# Patient Record
Sex: MALE | Race: WHITE | NOT HISPANIC OR LATINO | URBAN - METROPOLITAN AREA
[De-identification: names, ages, dates, MRNs, and addresses within clinical notes are randomized per-mention and may not be internally consistent; named-entity substitution may affect disease eponyms.]

---

## 2024-10-21 ENCOUNTER — TELEPHONE (OUTPATIENT)
Dept: PRIMARY CARE | Facility: CLINIC | Age: 24
End: 2024-10-21

## 2024-10-21 PROBLEM — M43.17 SPONDYLOLISTHESIS AT L5-S1 LEVEL: Status: ACTIVE | Noted: 2024-10-21

## 2024-10-21 PROBLEM — L70.9 ACNE: Status: ACTIVE | Noted: 2024-10-21

## 2024-10-21 PROBLEM — J02.9 ACUTE PHARYNGITIS: Status: ACTIVE | Noted: 2024-10-21

## 2024-10-21 PROBLEM — S05.8X2A: Status: ACTIVE | Noted: 2024-10-21

## 2024-10-21 PROBLEM — K59.09 CONSTIPATION, CHRONIC: Status: ACTIVE | Noted: 2024-10-21

## 2024-10-21 PROBLEM — R53.83 FATIGUE: Status: ACTIVE | Noted: 2024-10-21

## 2024-10-21 PROBLEM — M76.52 PATELLAR TENDINITIS OF LEFT KNEE: Status: ACTIVE | Noted: 2024-10-21

## 2024-10-21 PROBLEM — R01.1 SYSTOLIC MURMUR: Status: ACTIVE | Noted: 2024-10-21

## 2024-10-21 PROBLEM — Q22.2 CONGENITAL PULMONARY VALVE INSUFFICIENCY (HHS-HCC): Status: ACTIVE | Noted: 2024-10-21

## 2024-10-21 PROBLEM — M54.9 MID BACK PAIN: Status: ACTIVE | Noted: 2024-10-21

## 2024-10-21 PROBLEM — R01.2: Status: ACTIVE | Noted: 2024-10-21

## 2024-10-21 PROBLEM — L73.9 FOLLICULITIS: Status: ACTIVE | Noted: 2024-10-21

## 2024-10-21 PROBLEM — F95.9 FACIAL TIC: Status: ACTIVE | Noted: 2024-10-21

## 2024-10-21 NOTE — TELEPHONE ENCOUNTER
The patient called requesting a referral to see a pulmonologist, He has not been seen in 2 years. Please call and have the patient book an appointment.

## 2024-12-19 ENCOUNTER — LAB (OUTPATIENT)
Dept: LAB | Facility: LAB | Age: 24
End: 2024-12-19
Payer: COMMERCIAL

## 2024-12-19 ENCOUNTER — APPOINTMENT (OUTPATIENT)
Dept: PRIMARY CARE | Facility: CLINIC | Age: 24
End: 2024-12-19
Payer: COMMERCIAL

## 2024-12-19 VITALS
DIASTOLIC BLOOD PRESSURE: 84 MMHG | HEART RATE: 80 BPM | BODY MASS INDEX: 25.25 KG/M2 | OXYGEN SATURATION: 99 % | HEIGHT: 76 IN | TEMPERATURE: 97.9 F | WEIGHT: 207.4 LBS | SYSTOLIC BLOOD PRESSURE: 116 MMHG

## 2024-12-19 DIAGNOSIS — Q22.2 CONGENITAL PULMONARY VALVE INSUFFICIENCY (HHS-HCC): ICD-10-CM

## 2024-12-19 DIAGNOSIS — Z76.89 ENCOUNTER TO ESTABLISH CARE: ICD-10-CM

## 2024-12-19 DIAGNOSIS — R01.1 SYSTOLIC MURMUR: ICD-10-CM

## 2024-12-19 DIAGNOSIS — Z76.89 ENCOUNTER TO ESTABLISH CARE: Primary | ICD-10-CM

## 2024-12-19 DIAGNOSIS — B36.0 TINEA VERSICOLOR: ICD-10-CM

## 2024-12-19 LAB
ALBUMIN SERPL BCP-MCNC: 4.4 G/DL (ref 3.4–5)
ALP SERPL-CCNC: 44 U/L (ref 33–120)
ALT SERPL W P-5'-P-CCNC: 25 U/L (ref 10–52)
ANION GAP SERPL CALC-SCNC: 10 MMOL/L (ref 10–20)
AST SERPL W P-5'-P-CCNC: 63 U/L (ref 9–39)
BILIRUB SERPL-MCNC: 0.9 MG/DL (ref 0–1.2)
BUN SERPL-MCNC: 15 MG/DL (ref 6–23)
CALCIUM SERPL-MCNC: 9.5 MG/DL (ref 8.6–10.3)
CHLORIDE SERPL-SCNC: 106 MMOL/L (ref 98–107)
CHOLEST SERPL-MCNC: 162 MG/DL (ref 0–199)
CHOLESTEROL/HDL RATIO: 3.4
CO2 SERPL-SCNC: 29 MMOL/L (ref 21–32)
CREAT SERPL-MCNC: 0.94 MG/DL (ref 0.5–1.3)
EGFRCR SERPLBLD CKD-EPI 2021: >90 ML/MIN/1.73M*2
ERYTHROCYTE [DISTWIDTH] IN BLOOD BY AUTOMATED COUNT: 11.9 % (ref 11.5–14.5)
GLUCOSE SERPL-MCNC: 90 MG/DL (ref 74–99)
HCT VFR BLD AUTO: 45.9 % (ref 41–52)
HDLC SERPL-MCNC: 48.1 MG/DL
HGB BLD-MCNC: 15.5 G/DL (ref 13.5–17.5)
LDLC SERPL CALC-MCNC: 98 MG/DL
MAGNESIUM SERPL-MCNC: 1.95 MG/DL (ref 1.6–2.4)
MCH RBC QN AUTO: 31.8 PG (ref 26–34)
MCHC RBC AUTO-ENTMCNC: 33.8 G/DL (ref 32–36)
MCV RBC AUTO: 94 FL (ref 80–100)
NON HDL CHOLESTEROL: 114 MG/DL (ref 0–149)
NRBC BLD-RTO: 0 /100 WBCS (ref 0–0)
PLATELET # BLD AUTO: 220 X10*3/UL (ref 150–450)
POTASSIUM SERPL-SCNC: 4.2 MMOL/L (ref 3.5–5.3)
PROT SERPL-MCNC: 6.9 G/DL (ref 6.4–8.2)
RBC # BLD AUTO: 4.87 X10*6/UL (ref 4.5–5.9)
SODIUM SERPL-SCNC: 141 MMOL/L (ref 136–145)
TRIGL SERPL-MCNC: 82 MG/DL (ref 0–114)
VLDL: 16 MG/DL (ref 0–40)
WBC # BLD AUTO: 7.9 X10*3/UL (ref 4.4–11.3)

## 2024-12-19 PROCEDURE — 3008F BODY MASS INDEX DOCD: CPT | Performed by: FAMILY MEDICINE

## 2024-12-19 PROCEDURE — 99204 OFFICE O/P NEW MOD 45 MIN: CPT | Performed by: FAMILY MEDICINE

## 2024-12-19 PROCEDURE — 36415 COLL VENOUS BLD VENIPUNCTURE: CPT

## 2024-12-19 PROCEDURE — 80053 COMPREHEN METABOLIC PANEL: CPT

## 2024-12-19 PROCEDURE — 83735 ASSAY OF MAGNESIUM: CPT

## 2024-12-19 PROCEDURE — 85027 COMPLETE CBC AUTOMATED: CPT

## 2024-12-19 PROCEDURE — 80061 LIPID PANEL: CPT

## 2024-12-19 RX ORDER — KETOCONAZOLE 20 MG/G
CREAM TOPICAL 2 TIMES DAILY
Qty: 60 G | Refills: 2 | Status: SHIPPED | OUTPATIENT
Start: 2024-12-19 | End: 2025-01-02

## 2024-12-19 ASSESSMENT — ENCOUNTER SYMPTOMS
HEMATURIA: 0
DIARRHEA: 0
PALPITATIONS: 0
ARTHRALGIAS: 0
HEADACHES: 0
CONSTIPATION: 0
FLANK PAIN: 0
DYSURIA: 0
FEVER: 0
LIGHT-HEADEDNESS: 0
ABDOMINAL PAIN: 0
COUGH: 0
AGITATION: 0
NAUSEA: 0
JOINT SWELLING: 0
SORE THROAT: 0
WHEEZING: 0
NERVOUS/ANXIOUS: 0
SHORTNESS OF BREATH: 0
UNEXPECTED WEIGHT CHANGE: 0
FATIGUE: 0
MYALGIAS: 0

## 2024-12-19 ASSESSMENT — PATIENT HEALTH QUESTIONNAIRE - PHQ9
SUM OF ALL RESPONSES TO PHQ9 QUESTIONS 1 AND 2: 0
1. LITTLE INTEREST OR PLEASURE IN DOING THINGS: NOT AT ALL
2. FEELING DOWN, DEPRESSED OR HOPELESS: NOT AT ALL

## 2024-12-19 NOTE — PROGRESS NOTES
Subjective   Chief complaint: Farhat Jackson is a 24 y.o. male who presents for Establish Care (Patient is seeking a referral to pulmonology ).    HPI:  HPI  To establish.  Complains of a rash.  On his back.  Scaly.  Red.  Circular.  Patches.  Hypopigmented.  Tinea versicolor.  Topical prescription.  History of congenital pulmonic valve disorder.  Has not been seen by cardiology recently.  Previous echo from 3 years ago was reviewed.  Abnormal.  Murmur noted on examination.  Will facilitate cardiology consultation.  General medical labs are ordered.  Past medical family surgical social history reviewed.  Follow-up as scheduled.    Past Medical History:   Diagnosis Date    Dorsalgia, unspecified 11/02/2021    Mid back pain     Past Surgical History:   Procedure Laterality Date    OTHER SURGICAL HISTORY  10/27/2021    Surgery     Social History     Socioeconomic History    Marital status: Single     Spouse name: Not on file    Number of children: Not on file    Years of education: Not on file    Highest education level: Not on file   Occupational History    Not on file   Tobacco Use    Smoking status: Every Day     Types: Cigarettes    Smokeless tobacco: Never   Vaping Use    Vaping status: Never Used   Substance and Sexual Activity    Alcohol use: Not Currently    Drug use: Not Currently    Sexual activity: Not on file   Other Topics Concern    Not on file   Social History Narrative    Not on file     Social Drivers of Health     Financial Resource Strain: Not on file   Food Insecurity: Not on file   Transportation Needs: Not on file   Physical Activity: Not on file   Stress: Not on file   Social Connections: Not on file   Intimate Partner Violence: Not on file   Housing Stability: Not on file     Family History   Problem Relation Name Age of Onset    Cancer Mother      Diverticulosis Father      Cancer Maternal Grandmother      Cancer Other         Objective   /84 (BP Location: Left arm, Patient Position:  "Sitting, BP Cuff Size: Adult)   Pulse 80   Temp 36.6 °C (97.9 °F) (Temporal)   Ht 1.93 m (6' 4\")   Wt 94.1 kg (207 lb 6.4 oz)   SpO2 99% Comment: RA  BMI 25.25 kg/m²   Physical Exam  Vitals and nursing note reviewed.   Constitutional:       General: He is not in acute distress.     Appearance: He is not ill-appearing or toxic-appearing.   HENT:      Head: Normocephalic and atraumatic.      Mouth/Throat:      Pharynx: No oropharyngeal exudate or posterior oropharyngeal erythema.   Eyes:      Extraocular Movements: Extraocular movements intact.      Conjunctiva/sclera: Conjunctivae normal.      Pupils: Pupils are equal, round, and reactive to light.   Cardiovascular:      Rate and Rhythm: Normal rate and regular rhythm.      Pulses: Normal pulses.      Heart sounds: Murmur heard.   Pulmonary:      Effort: Pulmonary effort is normal.      Breath sounds: Normal breath sounds. No wheezing, rhonchi or rales.   Abdominal:      General: Abdomen is flat. Bowel sounds are normal. There is no distension.      Palpations: Abdomen is soft. There is no mass.      Tenderness: There is no abdominal tenderness.      Hernia: No hernia is present.   Musculoskeletal:         General: No swelling or deformity. Normal range of motion.      Cervical back: Normal range of motion and neck supple.   Skin:     General: Skin is warm and dry.      Capillary Refill: Capillary refill takes less than 2 seconds.      Coloration: Skin is not jaundiced.      Findings: No erythema or rash.   Neurological:      General: No focal deficit present.      Mental Status: He is oriented to person, place, and time. Mental status is at baseline.   Psychiatric:         Mood and Affect: Mood normal.         Behavior: Behavior normal.         Thought Content: Thought content normal.         Judgment: Judgment normal.       Review of Systems   Constitutional:  Negative for fatigue, fever and unexpected weight change.   HENT:  Negative for congestion and sore " throat.    Respiratory:  Negative for cough, shortness of breath and wheezing.    Cardiovascular:  Negative for chest pain, palpitations and leg swelling.   Gastrointestinal:  Negative for abdominal pain, constipation, diarrhea and nausea.   Genitourinary:  Negative for dysuria, flank pain and hematuria.   Musculoskeletal:  Negative for arthralgias, joint swelling and myalgias.   Skin:  Negative for rash.   Neurological:  Negative for syncope, light-headedness and headaches.   Psychiatric/Behavioral:  Negative for agitation. The patient is not nervous/anxious.       I have reviewed and reconciled the medication list with the patient today.   Current Outpatient Medications:     ketoconazole (NIZOral) 2 % cream, Apply topically 2 times a day for 14 days., Disp: 60 g, Rfl: 2     Imaging:  No results found.     Labs reviewed:    Lab Results   Component Value Date    WBC 6.3 05/07/2022    HGB 14.9 05/07/2022    HCT 44.8 05/07/2022     05/07/2022    CHOL 127 10/27/2020    TRIG 52 10/27/2020    HDL 42.0 10/27/2020    ALT 95 (H) 05/07/2022    AST 68 (H) 05/07/2022     05/07/2022    K 4.6 05/07/2022     05/07/2022    CREATININE 0.89 05/07/2022    BUN 16 05/07/2022    CO2 28 05/07/2022       Assessment/Plan   Problem List Items Addressed This Visit             ICD-10-CM    Congenital pulmonary valve insufficiency (HHS-HCC) Q22.2    Relevant Orders    Referral to Cardiology    Systolic murmur R01.1     Other Visit Diagnoses         Codes    Encounter to establish care    -  Primary Z76.89    Relevant Orders    Comprehensive metabolic panel    Lipid panel    CBC    Magnesium    Tinea versicolor     B36.0    Relevant Medications    ketoconazole (NIZOral) 2 % cream            Reinforced lifestyle modifications  Continue current medications as listed  Physical activity as tolerated and healthy diet encouraged  Maintain a healthy weight  Follow up in 6mo

## 2024-12-26 ENCOUNTER — APPOINTMENT (OUTPATIENT)
Dept: CARDIOLOGY | Facility: CLINIC | Age: 24
End: 2024-12-26
Payer: COMMERCIAL

## 2024-12-26 ENCOUNTER — ANCILLARY PROCEDURE (OUTPATIENT)
Dept: CARDIOLOGY | Facility: CLINIC | Age: 24
End: 2024-12-26
Payer: COMMERCIAL

## 2024-12-26 VITALS
SYSTOLIC BLOOD PRESSURE: 124 MMHG | BODY MASS INDEX: 24.84 KG/M2 | WEIGHT: 204 LBS | HEART RATE: 52 BPM | HEIGHT: 76 IN | DIASTOLIC BLOOD PRESSURE: 82 MMHG

## 2024-12-26 DIAGNOSIS — R00.1 BRADYCARDIA: Primary | ICD-10-CM

## 2024-12-26 DIAGNOSIS — Q22.2 CONGENITAL PULMONARY VALVE INSUFFICIENCY (HHS-HCC): ICD-10-CM

## 2024-12-26 DIAGNOSIS — R00.1 BRADYCARDIA: ICD-10-CM

## 2024-12-26 LAB — BODY SURFACE AREA: 2.23 M2

## 2024-12-26 PROCEDURE — 93000 ELECTROCARDIOGRAM COMPLETE: CPT | Performed by: NURSE PRACTITIONER

## 2024-12-26 PROCEDURE — 3008F BODY MASS INDEX DOCD: CPT | Performed by: NURSE PRACTITIONER

## 2024-12-26 PROCEDURE — 99204 OFFICE O/P NEW MOD 45 MIN: CPT | Performed by: NURSE PRACTITIONER

## 2024-12-26 NOTE — PROGRESS NOTES
Farhat Jackson is a 24 y.o. male that presents to the office today for  new patient cardiac evaluation.  He was referred by his PCP Dr. South for pulmonary valve stenosis.  He previously followed with [facility] pediatric cardiology for mild pulmonary valve stenosis.  Other medical history as noted below. He denies tobacco use or vaping.  Admits to daily cannabis use, social alcohol use.  Daily caffeine intake 1-2 cups coffee. He exercises on a regular basis, with cardio and weights, admits to running 2-3 times per week 2-3 miles at a time.     He denies cheat pian, chest pressure, chest tightness, shortness of breath, palpitations, lightheadedness, dizziness.      Testing Reviewed  EKG obtained in the office today and verified with Dr. Rolon shows sinus bradycardia with marked sinus arrhythmia, rightward axis HR 52  bpm, QT/Qtc 398/370    8/10/2018 Echo   1. Doming pulmonary valve.   2. Mild pulmonary stenosis, peak gradient: 30 mmHg, mean gradient: 18 mmHg.   3. Mild pulmonary insufficiency.   4. Mildly dilated left pulmonary artery.   5. Qualitatively, borderline / mildly dilated right ventricle with normal systolic    function.   6. Normal left ventricular size and systolic function.     CBC:   Lab Results   Component Value Date    WBC 7.9 12/19/2024    RBC 4.87 12/19/2024    HGB 15.5 12/19/2024    HCT 45.9 12/19/2024     12/19/2024        CMP:    Lab Results   Component Value Date     12/19/2024    K 4.2 12/19/2024     12/19/2024    CO2 29 12/19/2024    BUN 15 12/19/2024    CREATININE 0.94 12/19/2024    GLUCOSE 90 12/19/2024    CALCIUM 9.5 12/19/2024       Lipid Profile:    Lab Results   Component Value Date    TRIG 82 12/19/2024    HDL 48.1 12/19/2024    LDLCALC 98 12/19/2024       BMP:  Lab Results   Component Value Date     12/19/2024     05/07/2022     10/27/2020    K 4.2 12/19/2024    K 4.6 05/07/2022    K 4.2 10/27/2020     12/19/2024     05/07/2022      "10/27/2020    CO2 29 12/19/2024    CO2 28 05/07/2022    CO2 27 10/27/2020    BUN 15 12/19/2024    BUN 16 05/07/2022    BUN 20 10/27/2020    CREATININE 0.94 12/19/2024    CREATININE 0.89 05/07/2022    CREATININE 1.09 10/27/2020       CBC:  Lab Results   Component Value Date    WBC 7.9 12/19/2024    WBC 6.3 05/07/2022    RBC 4.87 12/19/2024    RBC 4.61 05/07/2022    HGB 15.5 12/19/2024    HGB 14.9 05/07/2022    HCT 45.9 12/19/2024    HCT 44.8 05/07/2022    MCV 94 12/19/2024    MCV 97 05/07/2022    MCH 31.8 12/19/2024    MCHC 33.8 12/19/2024    MCHC 33.3 05/07/2022    RDW 11.9 12/19/2024    RDW 12.2 05/07/2022     12/19/2024     05/07/2022       Hepatic Function Panel:    Lab Results   Component Value Date    ALKPHOS 44 12/19/2024    ALT 25 12/19/2024    AST 63 (H) 12/19/2024    PROT 6.9 12/19/2024    BILITOT 0.9 12/19/2024       HgBA1c:    No results found for: \"HGBA1C\"    Magnesium:    Lab Results   Component Value Date    MG 1.95 12/19/2024             Patient Active Problem List   Diagnosis    Abrasion of eye, left, initial encounter    Acne    Acute pharyngitis    Congenital pulmonary valve insufficiency (HHS-HCC)    Constipation, chronic    Facial tic    Fatigue    Folliculitis    Mid back pain    Patellar tendinitis of left knee    Split S1 (first heart sound)    Spondylolisthesis at L5-S1 level    Systolic murmur       Social History     Tobacco Use    Smoking status: Every Day     Types: Cigarettes    Smokeless tobacco: Never   Vaping Use    Vaping status: Never Used   Substance Use Topics    Alcohol use: Not Currently    Drug use: Not Currently       Past Medical History:   Diagnosis Date    Dorsalgia, unspecified 11/02/2021    Mid back pain         Current Outpatient Medications:     ketoconazole (NIZOral) 2 % cream, Apply topically 2 times a day for 14 days., Disp: 60 g, Rfl: 2    MAGNESIUM ORAL, Take by mouth once daily., Disp: , Rfl:     Patient has no known allergies.    Family History " "  Problem Relation Name Age of Onset    Cancer Mother      Diverticulosis Father      Cancer Maternal Grandmother      Cancer Other         Past Surgical History:   Procedure Laterality Date    OTHER SURGICAL HISTORY  10/27/2021    Surgery          Review of systems  Constitutional: No weight loss, fever, chills, weakness or fatigue  HEENT: No visual loss, blurred vision, double vision or yellow sclerae  Skin: No rash or itching  Cardiovascular: No chest pain, pressure or discomfort, No palpitations or edema.  Respiratory: No shortness of breath, cough or sputum  Gastrointestinal: No nausea, vomiting or diarrhea. No bloody or dark tarry stools.  Neurological: No headache, lightheadedness, dizziness, syncope.   Musculoskeletal: No muscle, back pain, joint pain or stiffness.  Hematologic: No anemia, bleeding or bruising.    /82 (BP Location: Left arm, Patient Position: Sitting)   Pulse 52   Ht 1.93 m (6' 4\")   Wt 92.5 kg (204 lb)   BMI 24.83 kg/m²     Physical Exam  Constitutional: Well developed, awake/alert x 3, no distress.  Head/Neck: No JVD, No bruits  Respiratory/Thorax: patent airways, CTAB, normal breath sounds with good expansion.  Cardiovascular: Regular rate and rhythm,  normal S1 and S2,   Gastrointestinal: Non distended, soft, non-tender, no rebound tenderness or guarding.  Extremities: No cyanosis, edema.   Neurological: Alert and oriented x 3. Moves extremities spontaneous with purpose.  Psychological: Appropriate mood and behavior  Skin: Warm and Dry. No lesions or rashes.     Assessment/Plan  Pulmonary valve stenosis:  Previous echo as noted above.  Obtain echocardiogram to assess valve and pump function  Bradycardia:  EKG as noted in office today.  He reports that his watch often indicates his HR ranges from 49 - 80's at rest.  Will obtain 48 hr holter monitor.   Follow in office with Dr. Thrasher/Dr. Rolon after testing for results and further recommendations.       Please excuse any errors " in grammar or translation related to dictation, voice recognition software was used to prepare this document.

## 2025-01-03 LAB — BODY SURFACE AREA: 2.23 M2

## 2025-01-03 PROCEDURE — 93227 XTRNL ECG REC<48 HR R&I: CPT | Performed by: INTERNAL MEDICINE

## 2025-01-14 ENCOUNTER — APPOINTMENT (OUTPATIENT)
Dept: CARDIOLOGY | Facility: CLINIC | Age: 25
End: 2025-01-14
Payer: COMMERCIAL

## 2025-01-14 ENCOUNTER — ANCILLARY PROCEDURE (OUTPATIENT)
Dept: CARDIOLOGY | Facility: HOSPITAL | Age: 25
End: 2025-01-14
Payer: COMMERCIAL

## 2025-01-14 DIAGNOSIS — R01.1 CARDIAC MURMUR, UNSPECIFIED: ICD-10-CM

## 2025-01-14 DIAGNOSIS — Q22.2 CONGENITAL PULMONARY VALVE INSUFFICIENCY (HHS-HCC): ICD-10-CM

## 2025-01-14 PROCEDURE — 93306 TTE W/DOPPLER COMPLETE: CPT

## 2025-01-14 PROCEDURE — 93306 TTE W/DOPPLER COMPLETE: CPT | Performed by: INTERNAL MEDICINE

## 2025-01-15 LAB
AORTIC VALVE MEAN GRADIENT: 2 MMHG
AORTIC VALVE PEAK VELOCITY: 0.99 M/S
AV PEAK GRADIENT: 4 MMHG
AVA (PEAK VEL): 2.87 CM2
AVA (VTI): 2.71 CM2
EJECTION FRACTION APICAL 4 CHAMBER: 49.2
EJECTION FRACTION: 60 %
LEFT VENTRICLE INTERNAL DIMENSION DIASTOLE: 4.7 CM (ref 3.5–6)
LEFT VENTRICULAR OUTFLOW TRACT DIAMETER: 2.11 CM
LV EJECTION FRACTION BIPLANE: 54 %
MITRAL VALVE E/A RATIO: 1.59
RIGHT VENTRICLE FREE WALL PEAK S': 11.46 CM/S
RIGHT VENTRICLE PEAK SYSTOLIC PRESSURE: 17.2 MMHG
TRICUSPID ANNULAR PLANE SYSTOLIC EXCURSION: 2.1 CM

## 2025-02-04 ENCOUNTER — APPOINTMENT (OUTPATIENT)
Dept: CARDIOLOGY | Facility: CLINIC | Age: 25
End: 2025-02-04
Payer: COMMERCIAL

## 2025-03-13 ENCOUNTER — APPOINTMENT (OUTPATIENT)
Dept: CARDIOLOGY | Facility: CLINIC | Age: 25
End: 2025-03-13
Payer: COMMERCIAL

## 2025-03-13 VITALS
DIASTOLIC BLOOD PRESSURE: 64 MMHG | SYSTOLIC BLOOD PRESSURE: 106 MMHG | BODY MASS INDEX: 29.36 KG/M2 | HEIGHT: 72 IN | HEART RATE: 74 BPM | WEIGHT: 216.8 LBS

## 2025-03-13 DIAGNOSIS — Q25.6 MILD PULMONARY STENOSIS (HHS-HCC): ICD-10-CM

## 2025-03-13 DIAGNOSIS — R01.1 SYSTOLIC MURMUR: Primary | ICD-10-CM

## 2025-03-13 DIAGNOSIS — R00.1 BRADYCARDIA: ICD-10-CM

## 2025-03-13 PROCEDURE — 3008F BODY MASS INDEX DOCD: CPT | Performed by: INTERNAL MEDICINE

## 2025-03-13 PROCEDURE — 99213 OFFICE O/P EST LOW 20 MIN: CPT | Performed by: INTERNAL MEDICINE

## 2025-03-13 NOTE — PATIENT INSTRUCTIONS
Follow up as needed only      DID YOU KNOW  We have a pharmacy here in the [facility].  They can fill all prescriptions, not just cardiac medications.  Prescriptions from other pharmacies can easily be transferred to the  pharmacy by the  pharmacist on site.   pharmacies offer FREE HOME DELIVERY on medications to anywhere in [place]. They can sync your medications. Typically prescriptions can be ready in 10 - 15 minutes. If pharmacy is unable to fill your  prescription or if cost is more than your paying now the Pharmacist can easily transfer back to your Pharmacy of choice. Pharmacy phone # [phone].     Please bring all medicines, vitamins, and herbal supplements with you in original bottles to every appointment!!!!    Prescriptions will not be filled unless you are compliant with your follow up appointments or have a follow up appointment scheduled as per instruction of your physician. Refills should be requested at the time of your visit.

## 2025-03-13 NOTE — PROGRESS NOTES
Patient:  Farhat Jackson  YOB: 2000  MRN: [id number]       HPI:       Farhat Jackson is a 25 y.o. male who returns today for cardiac follow-up.  He was seen in consultation by RANJEET Arreaga on December 26, 2024 for evaluation of pulmonic stenosis.  He was previously followed by [facility] pediatric cardiology.  He was diagnosed with mild pulmonic stenosis.  No history of atherosclerotic heart disease.  No history of hypertension, hyperlipidemia, or diabetes mellitus.  No tobacco use.  He uses cannabis on a daily basis.      An echocardiogram on January 14, 2025 showed an estimated LV ejection fraction of 60%.  Normal right ventricular systolic function.  Normal chamber size and function.  Peak velocity across the pulmonic valve is 1.7 m/s with a peak gradient 12 mmHg consistent with mild colonic stenosis.     He has been doing very well since his last visit.  He remains very active without any limitations.  He works out on a daily basis.  He is in his senior year at [employer] and is studying food science.  He works at a [employer].  Insert he denies lab studies December 19, 2024 showed a normal CBC and CMP with exception of an AST of 63.  Cholesterol 162 with HDL 48, LDL 98, and triglycerides 82.    He clinically is stable from a cardiac standpoint.  I advised that he have a follow-up echocardiogram in approximately 3 years.  Follow-up at that time or sooner if any cardiovascular related symptoms arise.  Other details noted below.    The above portion of this note was dictated by me using voice recognition software.  I personally performed the services described in the documentation.  The scribe entering the documentation below was in my presence.  I affirm that the information is both accurate and complete.      Objective:     Vitals:    03/13/25 0753   BP: 106/64   Pulse: 74       Wt Readings from Last 4 Encounters:   03/13/25 98.3 kg (216 lb 12.8 oz)   12/26/24 92.5 kg (204  lb)   12/19/24 94.1 kg (207 lb 6.4 oz)   06/29/22 71.9 kg (158 lb 8 oz)       Allergies:     No Known Allergies       Medications:     Current Outpatient Medications   Medication Instructions    MAGNESIUM ORAL Daily       Physical Examination:   GENERAL:  Well developed, well nourished, in no acute distress.  CHEST:  Symmetric and nontender.  NEURO/PSYCH:  Alert and oriented times three with approppriate behavior and responses.  NECK:  Supple, no JVD, no bruit.  LUNGS:  Clear to auscultation bilaterally, normal respiratory effort.  HEART:  Rate and rhythm regular with I/VI JAVIER LSB, no gallop appreciated.        There are no rubs, clicks or heaves.  EXTREMITIES:  Warm with good color, no clubbing or cyanosis.  There is no edema noted.  PERIPHERAL VASCULAR:  Pulses present and equally palpable; 2+ throughout.      Lab:     CBC:   Lab Results   Component Value Date    WBC 7.9 12/19/2024    RBC 4.87 12/19/2024    HGB 15.5 12/19/2024    HCT 45.9 12/19/2024     12/19/2024        CMP:    Lab Results   Component Value Date     12/19/2024    K 4.2 12/19/2024     12/19/2024    CO2 29 12/19/2024    BUN 15 12/19/2024    CREATININE 0.94 12/19/2024    GLUCOSE 90 12/19/2024    CALCIUM 9.5 12/19/2024       Lipid Profile:    Lab Results   Component Value Date    TRIG 82 12/19/2024    HDL 48.1 12/19/2024    LDLCALC 98 12/19/2024       BMP:  Lab Results   Component Value Date     12/19/2024     05/07/2022     10/27/2020    K 4.2 12/19/2024    K 4.6 05/07/2022    K 4.2 10/27/2020     12/19/2024     05/07/2022     10/27/2020    CO2 29 12/19/2024    CO2 28 05/07/2022    CO2 27 10/27/2020    BUN 15 12/19/2024    BUN 16 05/07/2022    BUN 20 10/27/2020    CREATININE 0.94 12/19/2024    CREATININE 0.89 05/07/2022    CREATININE 1.09 10/27/2020       CBC:  Lab Results   Component Value Date    WBC 7.9 12/19/2024    WBC 6.3 05/07/2022    RBC 4.87 12/19/2024    RBC 4.61 05/07/2022    HGB 15.5  12/19/2024    HGB 14.9 05/07/2022    HCT 45.9 12/19/2024    HCT 44.8 05/07/2022    MCV 94 12/19/2024    MCV 97 05/07/2022    MCH 31.8 12/19/2024    MCHC 33.8 12/19/2024    MCHC 33.3 05/07/2022    RDW 11.9 12/19/2024    RDW 12.2 05/07/2022     12/19/2024     05/07/2022       Hepatic Function Panel:    Lab Results   Component Value Date    ALKPHOS 44 12/19/2024    ALT 25 12/19/2024    AST 63 (H) 12/19/2024    PROT 6.9 12/19/2024    BILITOT 0.9 12/19/2024       Magnesium:    Lab Results   Component Value Date    MG 1.95 12/19/2024       Problem List:     Patient Active Problem List   Diagnosis    Abrasion of eye, left, initial encounter    Acne    Acute pharyngitis    Congenital pulmonary valve insufficiency (HHS-HCC)    Constipation, chronic    Facial tic    Fatigue    Folliculitis    Mid back pain    Patellar tendinitis of left knee    Split S1 (first heart sound)    Spondylolisthesis at L5-S1 level    Systolic murmur       Asessment:     Problem List Items Addressed This Visit             ICD-10-CM    Systolic murmur - Primary R01.1    Mild pulmonary stenosis (HHS-HCC) Q25.6     Other Visit Diagnoses         Codes    Bradycardia     R00.1    Relevant Orders    Follow Up In Cardiology

## 2025-06-20 ENCOUNTER — APPOINTMENT (OUTPATIENT)
Dept: PRIMARY CARE | Facility: CLINIC | Age: 25
End: 2025-06-20
Payer: COMMERCIAL